# Patient Record
Sex: MALE | Race: WHITE | Employment: STUDENT | URBAN - METROPOLITAN AREA
[De-identification: names, ages, dates, MRNs, and addresses within clinical notes are randomized per-mention and may not be internally consistent; named-entity substitution may affect disease eponyms.]

---
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
## 2021-07-19 PROBLEM — Z00.00 ROUTINE GENERAL MEDICAL EXAMINATION AT A HEALTH CARE FACILITY: Status: ACTIVE | Noted: 2021-07-19

## 2022-05-17 ENCOUNTER — LAB ENCOUNTER (OUTPATIENT)
Dept: LAB | Age: 22
End: 2022-05-17
Attending: OTOLARYNGOLOGY
Payer: COMMERCIAL

## 2022-05-17 DIAGNOSIS — Z01.812 ENCOUNTER FOR PREOPERATIVE SCREENING LABORATORY TESTING FOR COVID-19 VIRUS: ICD-10-CM

## 2022-05-17 DIAGNOSIS — Z20.822 ENCOUNTER FOR PREOPERATIVE SCREENING LABORATORY TESTING FOR COVID-19 VIRUS: ICD-10-CM

## 2022-05-17 RX ORDER — SCOLOPAMINE TRANSDERMAL SYSTEM 1 MG/1
1 PATCH, EXTENDED RELEASE TRANSDERMAL ONCE
OUTPATIENT
Start: 2022-05-17 | End: 2022-05-17

## 2022-05-18 ENCOUNTER — ANESTHESIA EVENT (OUTPATIENT)
Dept: SURGERY | Facility: HOSPITAL | Age: 22
End: 2022-05-18
Payer: COMMERCIAL

## 2022-05-18 LAB — SARS-COV-2 RNA RESP QL NAA+PROBE: NOT DETECTED

## 2022-05-19 ENCOUNTER — ANESTHESIA (OUTPATIENT)
Dept: SURGERY | Facility: HOSPITAL | Age: 22
End: 2022-05-19
Payer: COMMERCIAL

## 2022-05-19 ENCOUNTER — HOSPITAL ENCOUNTER (OUTPATIENT)
Facility: HOSPITAL | Age: 22
Setting detail: HOSPITAL OUTPATIENT SURGERY
Discharge: HOME | End: 2022-05-19
Attending: OTOLARYNGOLOGY | Admitting: OTOLARYNGOLOGY
Payer: COMMERCIAL

## 2022-05-19 VITALS
DIASTOLIC BLOOD PRESSURE: 76 MMHG | BODY MASS INDEX: 25.06 KG/M2 | OXYGEN SATURATION: 96 % | WEIGHT: 179 LBS | RESPIRATION RATE: 18 BRPM | TEMPERATURE: 99 F | HEIGHT: 71 IN | HEART RATE: 52 BPM | SYSTOLIC BLOOD PRESSURE: 138 MMHG

## 2022-05-19 DIAGNOSIS — Z20.822 ENCOUNTER FOR PREOPERATIVE SCREENING LABORATORY TESTING FOR COVID-19 VIRUS: Primary | ICD-10-CM

## 2022-05-19 DIAGNOSIS — Z01.812 ENCOUNTER FOR PREOPERATIVE SCREENING LABORATORY TESTING FOR COVID-19 VIRUS: Primary | ICD-10-CM

## 2022-05-19 PROCEDURE — 09BL0ZZ EXCISION OF NASAL TURBINATE, OPEN APPROACH: ICD-10-PCS | Performed by: OTOLARYNGOLOGY

## 2022-05-19 PROCEDURE — 09RM07Z REPLACEMENT OF NASAL SEPTUM WITH AUTOLOGOUS TISSUE SUBSTITUTE, OPEN APPROACH: ICD-10-PCS | Performed by: OTOLARYNGOLOGY

## 2022-05-19 RX ORDER — PENICILLIN V POTASSIUM 500 MG/1
500 TABLET ORAL EVERY 6 HOURS
Status: ON HOLD | COMMUNITY
Start: 2021-12-23 | End: 2022-05-19

## 2022-05-19 RX ORDER — DEXAMETHASONE SODIUM PHOSPHATE 4 MG/ML
VIAL (ML) INJECTION AS NEEDED
Status: DISCONTINUED | OUTPATIENT
Start: 2022-05-19 | End: 2022-05-19 | Stop reason: SURG

## 2022-05-19 RX ORDER — NEOSTIGMINE METHYLSULFATE 1 MG/ML
INJECTION INTRAVENOUS AS NEEDED
Status: DISCONTINUED | OUTPATIENT
Start: 2022-05-19 | End: 2022-05-19 | Stop reason: SURG

## 2022-05-19 RX ORDER — OXYCODONE HYDROCHLORIDE AND ACETAMINOPHEN 5; 325 MG/1; MG/1
TABLET ORAL
COMMUNITY
Start: 2022-05-17

## 2022-05-19 RX ORDER — HYDROMORPHONE HYDROCHLORIDE 1 MG/ML
INJECTION, SOLUTION INTRAMUSCULAR; INTRAVENOUS; SUBCUTANEOUS
Status: COMPLETED
Start: 2022-05-19 | End: 2022-05-19

## 2022-05-19 RX ORDER — NALOXONE HYDROCHLORIDE 0.4 MG/ML
80 INJECTION, SOLUTION INTRAMUSCULAR; INTRAVENOUS; SUBCUTANEOUS AS NEEDED
Status: DISCONTINUED | OUTPATIENT
Start: 2022-05-19 | End: 2022-05-19

## 2022-05-19 RX ORDER — CEPHALEXIN 500 MG/1
1 CAPSULE ORAL 3 TIMES DAILY
COMMUNITY
Start: 2022-05-17 | End: 2022-05-24

## 2022-05-19 RX ORDER — HYDROCODONE BITARTRATE AND ACETAMINOPHEN 5; 325 MG/1; MG/1
1 TABLET ORAL ONCE AS NEEDED
Status: DISCONTINUED | OUTPATIENT
Start: 2022-05-19 | End: 2022-05-19

## 2022-05-19 RX ORDER — HYDROMORPHONE HYDROCHLORIDE 1 MG/ML
0.6 INJECTION, SOLUTION INTRAMUSCULAR; INTRAVENOUS; SUBCUTANEOUS EVERY 5 MIN PRN
Status: DISCONTINUED | OUTPATIENT
Start: 2022-05-19 | End: 2022-05-19

## 2022-05-19 RX ORDER — GLYCOPYRROLATE 0.2 MG/ML
INJECTION, SOLUTION INTRAMUSCULAR; INTRAVENOUS AS NEEDED
Status: DISCONTINUED | OUTPATIENT
Start: 2022-05-19 | End: 2022-05-19 | Stop reason: SURG

## 2022-05-19 RX ORDER — MIDAZOLAM HYDROCHLORIDE 1 MG/ML
INJECTION INTRAMUSCULAR; INTRAVENOUS AS NEEDED
Status: DISCONTINUED | OUTPATIENT
Start: 2022-05-19 | End: 2022-05-19 | Stop reason: SURG

## 2022-05-19 RX ORDER — HYDROCODONE BITARTRATE AND ACETAMINOPHEN 5; 325 MG/1; MG/1
1 TABLET ORAL EVERY 4 HOURS PRN
COMMUNITY
Start: 2021-12-23

## 2022-05-19 RX ORDER — LIDOCAINE HYDROCHLORIDE 10 MG/ML
INJECTION, SOLUTION EPIDURAL; INFILTRATION; INTRACAUDAL; PERINEURAL AS NEEDED
Status: DISCONTINUED | OUTPATIENT
Start: 2022-05-19 | End: 2022-05-19 | Stop reason: SURG

## 2022-05-19 RX ORDER — ONDANSETRON 2 MG/ML
INJECTION INTRAMUSCULAR; INTRAVENOUS AS NEEDED
Status: DISCONTINUED | OUTPATIENT
Start: 2022-05-19 | End: 2022-05-19 | Stop reason: SURG

## 2022-05-19 RX ORDER — LIDOCAINE HYDROCHLORIDE AND EPINEPHRINE 10; 10 MG/ML; UG/ML
INJECTION, SOLUTION INFILTRATION; PERINEURAL AS NEEDED
Status: DISCONTINUED | OUTPATIENT
Start: 2022-05-19 | End: 2022-05-19 | Stop reason: HOSPADM

## 2022-05-19 RX ORDER — HYDROMORPHONE HYDROCHLORIDE 1 MG/ML
0.4 INJECTION, SOLUTION INTRAMUSCULAR; INTRAVENOUS; SUBCUTANEOUS EVERY 5 MIN PRN
Status: DISCONTINUED | OUTPATIENT
Start: 2022-05-19 | End: 2022-05-19

## 2022-05-19 RX ORDER — ROCURONIUM BROMIDE 10 MG/ML
INJECTION, SOLUTION INTRAVENOUS AS NEEDED
Status: DISCONTINUED | OUTPATIENT
Start: 2022-05-19 | End: 2022-05-19 | Stop reason: SURG

## 2022-05-19 RX ORDER — HYDROCODONE BITARTRATE AND ACETAMINOPHEN 5; 325 MG/1; MG/1
2 TABLET ORAL ONCE AS NEEDED
Status: DISCONTINUED | OUTPATIENT
Start: 2022-05-19 | End: 2022-05-19

## 2022-05-19 RX ORDER — SODIUM CHLORIDE, SODIUM LACTATE, POTASSIUM CHLORIDE, CALCIUM CHLORIDE 600; 310; 30; 20 MG/100ML; MG/100ML; MG/100ML; MG/100ML
INJECTION, SOLUTION INTRAVENOUS CONTINUOUS
Status: DISCONTINUED | OUTPATIENT
Start: 2022-05-19 | End: 2022-05-19

## 2022-05-19 RX ORDER — CEFAZOLIN SODIUM/WATER 2 G/20 ML
2 SYRINGE (ML) INTRAVENOUS ONCE
Status: COMPLETED | OUTPATIENT
Start: 2022-05-19 | End: 2022-05-19

## 2022-05-19 RX ORDER — ACETAMINOPHEN 500 MG
1000 TABLET ORAL ONCE AS NEEDED
Status: DISCONTINUED | OUTPATIENT
Start: 2022-05-19 | End: 2022-05-19

## 2022-05-19 RX ORDER — ONDANSETRON 2 MG/ML
4 INJECTION INTRAMUSCULAR; INTRAVENOUS EVERY 6 HOURS PRN
Status: DISCONTINUED | OUTPATIENT
Start: 2022-05-19 | End: 2022-05-19

## 2022-05-19 RX ORDER — ACETAMINOPHEN 500 MG
1000 TABLET ORAL ONCE
Status: DISCONTINUED | OUTPATIENT
Start: 2022-05-19 | End: 2022-05-19 | Stop reason: HOSPADM

## 2022-05-19 RX ORDER — HYDROMORPHONE HYDROCHLORIDE 1 MG/ML
0.2 INJECTION, SOLUTION INTRAMUSCULAR; INTRAVENOUS; SUBCUTANEOUS EVERY 5 MIN PRN
Status: DISCONTINUED | OUTPATIENT
Start: 2022-05-19 | End: 2022-05-19

## 2022-05-19 RX ADMIN — CEFAZOLIN SODIUM/WATER 2 G: 2 G/20 ML SYRINGE (ML) INTRAVENOUS at 11:36:00

## 2022-05-19 RX ADMIN — SODIUM CHLORIDE, SODIUM LACTATE, POTASSIUM CHLORIDE, CALCIUM CHLORIDE: 600; 310; 30; 20 INJECTION, SOLUTION INTRAVENOUS at 11:36:00

## 2022-05-19 RX ADMIN — LIDOCAINE HYDROCHLORIDE 50 MG: 10 INJECTION, SOLUTION EPIDURAL; INFILTRATION; INTRACAUDAL; PERINEURAL at 11:40:00

## 2022-05-19 RX ADMIN — NEOSTIGMINE METHYLSULFATE 5 MG: 1 INJECTION INTRAVENOUS at 14:47:00

## 2022-05-19 RX ADMIN — GLYCOPYRROLATE 0.8 MG: 0.2 INJECTION, SOLUTION INTRAMUSCULAR; INTRAVENOUS at 14:47:00

## 2022-05-19 RX ADMIN — DEXAMETHASONE SODIUM PHOSPHATE 8 MG: 4 MG/ML VIAL (ML) INJECTION at 11:45:00

## 2022-05-19 RX ADMIN — SODIUM CHLORIDE, SODIUM LACTATE, POTASSIUM CHLORIDE, CALCIUM CHLORIDE: 600; 310; 30; 20 INJECTION, SOLUTION INTRAVENOUS at 13:33:00

## 2022-05-19 RX ADMIN — SODIUM CHLORIDE, SODIUM LACTATE, POTASSIUM CHLORIDE, CALCIUM CHLORIDE: 600; 310; 30; 20 INJECTION, SOLUTION INTRAVENOUS at 12:10:00

## 2022-05-19 RX ADMIN — ROCURONIUM BROMIDE 40 MG: 10 INJECTION, SOLUTION INTRAVENOUS at 11:42:00

## 2022-05-19 RX ADMIN — SODIUM CHLORIDE, SODIUM LACTATE, POTASSIUM CHLORIDE, CALCIUM CHLORIDE: 600; 310; 30; 20 INJECTION, SOLUTION INTRAVENOUS at 14:02:00

## 2022-05-19 RX ADMIN — SODIUM CHLORIDE, SODIUM LACTATE, POTASSIUM CHLORIDE, CALCIUM CHLORIDE: 600; 310; 30; 20 INJECTION, SOLUTION INTRAVENOUS at 13:02:00

## 2022-05-19 RX ADMIN — MIDAZOLAM HYDROCHLORIDE 2 MG: 1 INJECTION INTRAMUSCULAR; INTRAVENOUS at 11:37:00

## 2022-05-19 RX ADMIN — ONDANSETRON 4 MG: 2 INJECTION INTRAMUSCULAR; INTRAVENOUS at 14:41:00

## 2022-05-19 RX ADMIN — ROCURONIUM BROMIDE 10 MG: 10 INJECTION, SOLUTION INTRAVENOUS at 14:02:00

## 2022-05-19 RX ADMIN — SODIUM CHLORIDE, SODIUM LACTATE, POTASSIUM CHLORIDE, CALCIUM CHLORIDE: 600; 310; 30; 20 INJECTION, SOLUTION INTRAVENOUS at 14:50:00

## 2022-05-19 NOTE — BRIEF OP NOTE
Pre-Operative Diagnosis: Deviated nasal septum   Nasal valve blockage  Nasal turbinate hypertrophy     Post-Operative Diagnosis: Deviated nasal septum   Nasal valve blockage  Nasal turbinate hypertrophy      Procedure Performed:   OPEN SEPTOPLASTY WITH POLYDIOXANONE PLATE INSERTION, BILATERAL FRACTURE SUBMUCOUS RESECTION OF NASAL TURBINATES    Surgeon(s) and Role:     Natalie Serrano MD - Primary    Assistant(s):        Surgical Findings: + DNS     Specimen: no     Estimated Blood Loss: Blood Output: 20 mL (5/19/2022  2:47 PM)      Dictation Number:       Sivakumar London MD  5/19/2022  2:55 PM

## 2022-05-19 NOTE — OR NURSING
Patient and father have questions regarding possible ear cartilage harvest, want to speak with Dr. Adrianna Luna prior to signing consent. Rene Joshi in [place] holding made aware.

## 2022-05-19 NOTE — OPERATIVE REPORT
PREOPERATIVE DIAGNOSIS:  Nasal obstruction secondary to septal deviation and inferior turbinate hypertrophy. POSTOPERATIVE DIAGNOSIS:  Nasal obstruction secondary to septal deviation and inferior turbinate hypertrophy. PROCEDURE:  1. Open septal reconstruction (via extracorporeal removal, flipping the quadrangular cartilage around, then reinsertion)  2. Bilateral  graft placement. 3.       Inferior turbinate submucous resection and outfracture. 4.       This case took 100% longer than the standard septoplasty because it took almost 3 hours which is over 100% longer. (See comment below). SURGEON: Dr Maria Elena Sanderson     ANESTHESIA TYPE:  General.     ANESTHESIOLOGIST:  Dr. Prashant Ricketts     INDICATIONS:  The is a patient who is presenting for functional breathing surgery. We discussed risks and benefits of performing the above surgery. The patient understands the risks and benefits including the possibility of re-deviation during the healing process. The patient also understands that an implant could get infected and that additional office visits and surgery may be necessary. And the patient also understands the possibility that there could be a septal perforation. All of the patient's questions were answered to the best of my ability. He is satisfied with the answers and is interested in proceeding with the planned procedure. FINDINGS:  1.       A severely deviated septum (starts anteriorly with a right caudal defelction, and goes back far into the nasal cavity creating a critically narrow airway on his left--with a prominent bone spur hitting the inferior turb on his left). 2.       Inferior turbinate hypertrophy bilaterally which is moderately severe. OPERATIVE TECHNIQUE:  The patient was brought to the operating room, placed on the table in supine position, and placed under anesthesia with an oral Lenora tube taped in the middle per Dr. Luigi Dominique.   I placed pledgets containing Afrin in both sides and then removed them. I injected 1% lidocaine containing 1:100,000 epinephrine into the mucoperichondrium on the left and then the right. I injected the marginal incision area of the dorsum of the nose and the columella. A total of 28 mL of local were used. The patient was then prepped and draped in a standard sterile fashion. An inverted V incision was drawn along the midportion of the columella, and the columella trunk was raised up off the lower lateral cartilages by extending the marginal incision first to the left, then to the right. I dissected an avascular plane between the upper lateral cartilages and the myocutaneous flap. Once I reached the nasal bones, a periosteal elevator was used. Interdomal ligament was divided sharply. I then raised the mucoperichondrial flap first on the right without any tears, and then I raised it on the left. There was a tear down in the midportion of the septum about correction down that was angling toward the front. This was because of a sharp maxillary crest spur starting anteriorly and going all the way back. Given the severity of the fractured septum, decision was made to extracorporeally remove the septum, so I cut down, leaving a small nubbin of dorsal cartilage attached to the nasal bone. I went back and skived down and then upward toward the perpendicular plate. I used a swivel knife then to remove the entire quadrangular cartilage. This was passed off the field. A working area was created on the back table. a foil template plate was cut to the appropriate size, sutured to the posterior portion of the quadrangular cartilage--which was nice and straight. There was plenty of excess cartilage which I trimmed into  grafts and sutured them along paralleling the dorsum.   I did remove the foil template The  grafts were left with some overlap so that it would fit in a tongue and groove-like fashion with the native dorsal septum. This construct was then rinsed copiously with Ancef irrigating solution. The whole construct was placed in the nose and anchored to the anterior spine with a 4-0 clear nylon, and the  grafts were held between the upper lateral cartilages in their native location with 27-gauge needles and then sutured into place with 5-0 purple PDS using a through-and-through stitch superiorly, in the middle, and inferiorly. A 4-0 clear nylon was used to anchor the septal construct to the nasal spine. A 4-0 clear nylon was used to suture the intermediate portion of the lower lateral cartilage to the septal angle. The columellar incision was closed with 6-0 Prolene to approximate the skin edges in an interrupted fashion and 4-0 chromic was used to close the marginal incision area in interrupted fashion. A quilting stitch was used starting underneath the septal angle and going back and forth underneath the dorsum and then dropping down to the floor and then back to the original starting point. Attention was turned toward the turbinate on the left, which was injectable with 1% lidocaine containing 1:100,000 epinephrine, approximately 3 mL local.  I pierced the anterior face with a 2 mm Endo shaver and created a tunnel medially, inferiorly, and superiorly. I engaged the foot pedal when the instrument was slowly withdrawn throughout the tunnel, thereby removing intramural tissue superiorly, inferiorly, and in the middle. The turbinate was therefore 10% to 15% smaller than the original starting point, and then I fractured the turbinate toward the septum and then against the lateral nasal wall. The exact same procedure was performed with the turbinate on the opposite side with the exact same results. The tear along the left flap was sutured under direct vision with a 4-0 plain suture on a Rosalio needle.   Out of an abundance of caution, a silastic sheet was placed on both sides of the nose and sutured there, instead of Acosta splints, with a 4-0 Prolene suture in 2 locations. This will be left in place for about 3 to 4 weeks. A petite [place] splint was placed over the patient's nasal dorsum. At this point, the procedure was terminated. He tolerated it well and was transported to recovery room in apparent good condition. At the end of procedure, all counts were reported as correct. COMMENT:  This case took almost 3 hours, which is almost 3 times as long as a standard septoplasty. This was, again, because of having the approach of having this needing to be done to address the septal fracture by extracorporeally moving the septum, straightening it off the table, and then reinserting it in the exact same location it was. This, therefore, represents the highest level of difficulty for this kind of case.        Marge Thakur M.D.

## 2022-05-19 NOTE — ANESTHESIA POSTPROCEDURE EVALUATION
[address] Patient Status:  Hospital Outpatient Surgery   Age/Gender 22year old male MRN [id number]   [facility] SURGERY Attending Paola Shah MD   [address] PCP Karan Alexandra MD       Anesthesia Post-op Note    OPEN SEPTOPLASTY WITH POLYDIOXANONE PLATE INSERTION, BILATERAL FRACTURE SUBMUCOUS RESECTION OF NASAL TURBINATES    Procedure Summary     Date: 05/19/22 Room / Location: [facility] OR 04 / 1404 [facility] OR    Anesthesia Start: 8154 Anesthesia Stop: 1500    Procedure: OPEN SEPTOPLASTY WITH POLYDIOXANONE PLATE INSERTION, BILATERAL FRACTURE SUBMUCOUS RESECTION OF NASAL TURBINATES (Bilateral Nose) Diagnosis:       (Deviated nasal septum )      (Nasal valve blockage)      (Nasal turbinate hypertrophy)    Surgeons: Paola Shah MD Anesthesiologist: Yaya Santiago MD    Anesthesia Type: general ASA Status: 2          Anesthesia Type: general    Vitals Value Taken Time   /72 05/19/22 1500   Temp 98.9 05/19/22 1500   Pulse 91 05/19/22 1500   Resp 18 05/19/22 1500   SpO2 98% (RA) 05/19/22 1500       Patient Location: PACU    Anesthesia Type: general    Airway Patency: patent    Postop Pain Control: adequate    Mental Status: preanesthetic baseline    Nausea/Vomiting: none    Cardiopulmonary/Hydration status: stable euvolemic    Complications: no apparent anesthesia related complications    Postop vital signs: stable    Dental Exam: Unchanged from Preop    Patient to be discharged from PACU when criteria met.

## 2022-05-19 NOTE — H&P
CHIEF COMPLAINT:  Nasal obstruction. HISTORY OF PRESENT ILLNESS:  This is a 20-year-old male, kindly referred by Dr. Jean Mills for evaluation of nasal obstruction secondary to a caudal deflection. The patient just got home from [facility] where he recently finished the semester. He is home for a couple of weeks for [facility] [place]. He has a longstanding complaint of right-sided obstruction that is present throughout the day. The patient has had nasal trauma in the past with reactions 2 or 3 times from sports, but nothing that ever needed to be treated. The patient has used Flonase for well over a month and has not found that to be helpful. PHYSICAL EXAMINATION:  General:  The patient is well developed and well nourished. Voice is normal, speaks in full sentences. Neuropsychiatric:  Alert and oriented x3, in no acute distress. Eyes:  Anicteric, EOMI, no spontaneous nystagmus. Skin:  No skin lesions on scalp or face. Musculoskeletal:  Normocephalic. Sinuses are nontender to palpation. Facial strength is symmetric and full. Sternocleidomastoid is symmetric. Neck has full range of motion. Ears:  Right auricle reveals no lesions, EAC clear and normal, tympanic membrane is normal with normal mobility. Left auricle reveals no lesions, EAC clear and normal, tympanic membrane is normal with normal mobility. Nose: He has a caudal septal deflection to the right blocking at least 50-60% of the external nostril. However, internally, the quadrangular cartilage is bowed to the left, creating some significant narrowness to the order of 70% blockage on the left side. Turbinates are mild to moderately hypertrophic on his left ,and severely hypertrophic on the right. Internal valve area is normal. Oral cavity:  Lips and buccal mucosa without lesions, no ulcers. Good dentition. Oropharynx without lesions. Larynx: Indirect exam reveals true vocal cords with normal mobility, no masses. Epiglottis is without lesions. Arytenoid and postcricoid regions are without lesions. Neck:  No lymphadenopathy. Parotid and submandibular glands are without mass or lesion. No thyromegaly. Trachea is midline. Heart: RRR  Lungs: CTA       ASSESSMENT AND PLAN:  The patient with nasal obstruction secondary to a severe deviation presenting as a caudal deflection to the right and internal bowing to the left, creating significant bilateral obstruction. In my view, the only way to fix this is by extracorporal removing the septum cutting it into 2 or 4 pieces, resuturing it through a PDS plate and reinserting it. We would need  grafts in order to reinsert it to prevent any notching along the dorsum. We also discussed the risks and benefits of doing a turbinate reduction at the same setting images. All of his and his dad's questions were answered to the best of my ability. They were satisfied with the answers and are interested in proceeding. It would be best to do this after he finishes his school year at [employer], and at the beginning of the summer, so that he is in town for at least 1 month because I am sure a PDS plate would be necessary. I would not like to have him do this at the end and then leave because if there is no infection, we had a much bigger problem. Photos were taken today. Jb Mitchell will call them with a time for the beginning of the summer for surgery.       Perry Mitchell MD

## 2022-05-19 NOTE — ANESTHESIA PROCEDURE NOTES
Airway  Date/Time: 5/19/2022 11:43 AM  Urgency: elective    Airway not difficult    General Information and Staff    Patient location during procedure: OR  Anesthesiologist: Yaya Santiago MD  Performed: anesthesiologist     Indications and Patient Condition  Indications for airway management: anesthesia  Sedation level: deep  Preoxygenated: yes  Patient position: sniffing  Mask difficulty assessment: 1 - vent by mask    Final Airway Details  Final airway type: endotracheal airway      Successful airway: ETT and oral [place] (Oral [place] ETT (PSR))  Cuffed: yes   Successful intubation technique: direct laryngoscopy  Facilitating devices/methods: intubating stylet  Endotracheal tube insertion site: oral  Blade: Mikhail  Blade size: #3  ETT size (mm): 7.5 (Oral [place] ETT (PSR))    Cormack-Lehane Classification: grade I - full view of glottis  Placement verified by: chest auscultation and capnometry   Measured from: lips  ETT to lips (cm): 23  Number of attempts at approach: 1

## (undated) DEVICE — SUT CHROMIC GUT 4-0 P-3 1654G

## (undated) DEVICE — STANDARD HYPODERMIC NEEDLE,POLYPROPYLENE HUB: Brand: MONOJECT

## (undated) DEVICE — CASED DISP BIPOLAR CORD

## (undated) DEVICE — SPLINT NASAL DENVER SM/MED

## (undated) DEVICE — GAUZE SPONGES,8 PLY: Brand: CURITY

## (undated) DEVICE — MARKER SURGICAL DUAL TIP

## (undated) DEVICE — SUT PDS II 5-0 RB-1 Z303H

## (undated) DEVICE — ELECTRODE ESURG 2.75IN EZ CLN

## (undated) DEVICE — Device

## (undated) DEVICE — PREP BETADINE SOL 5% EYE

## (undated) DEVICE — SUT PLAIN GUT 4-0 SC-1 1828H

## (undated) DEVICE — LIGHT HANDLE

## (undated) DEVICE — DECANTER BAG 9": Brand: MEDLINE INDUSTRIES, INC.

## (undated) DEVICE — TOWEL SURG OR 17X30IN BLUE

## (undated) DEVICE — APPLICATOR COTTON TIP 3 10/PK

## (undated) DEVICE — SLEEVE KENDALL SCD EXPRESS MED

## (undated) DEVICE — CODMAN® SURGICAL PATTIES 1/2" X 3" (1.27CM X 7.62CM): Brand: CODMAN®

## (undated) DEVICE — BLADE 1882040 5PK INFERIOR TURB 2MM

## (undated) DEVICE — SUT PLAIN GUT 5-0 PC-1 1915G

## (undated) DEVICE — STERILE POLYISOPRENE POWDER-FREE SURGICAL GLOVES: Brand: PROTEXIS

## (undated) DEVICE — 450 ML BOTTLE OF 0.05% CHLORHEXIDINE GLUCONATE IN 99.95% STERILE WATER FOR IRRIGATION, USP AND APPLICATOR.: Brand: IRRISEPT ANTIMICROBIAL WOUND LAVAGE

## (undated) DEVICE — SUT PROLENE 4-0 PS-2 8682G

## (undated) DEVICE — WATER STERILE AQUALITE 1000ML

## (undated) DEVICE — SPLINT 1524050 5PK PAIR DOYLE II AIRWAY: Brand: DOYLE II ™

## (undated) DEVICE — ABDOMINAL PAD: Brand: DERMACEA

## (undated) DEVICE — NEEDLE HPO 27GA 1.25IN PRCSNGL

## (undated) DEVICE — SYRINGE 10ML LL CONTRL SYRINGE

## (undated) DEVICE — HEAD AND NECK CDS-LF: Brand: MEDLINE INDUSTRIES, INC.

## (undated) DEVICE — #11 STERILE BLADE: Brand: POLYMER COATED BLADES